# Patient Record
(demographics unavailable — no encounter records)

---

## 2019-12-10 NOTE — NUR
PT BIB RA FROM HOME WITH A C/O MID TO LOWER BURNING, INTERMITTENT ABD PAIN. PT 
IS COMPARING THE PAIN WITH CONTRACTIONS. PT STATED THAT IT IS INTERMITTENT. PT 
HAD DIARRHEA TODAY AND DENIES DIARRHEA TODAY. PT WAS PLACED ON THE MONITOR AND 
CONTINUOUS PULSE OX.

## 2019-12-11 NOTE — NUR
-------------------------------------------------------------------------------

          *** Note undone in EDM - 12/11/19 at 0241 by [email or web address] ***          

-------------------------------------------------------------------------------

IV removed. Catheter intact and site benign. Pressure and 4x4 applied to site. 
No bleeding noted. Patient discharged to home in stable condition. Written and 
verbal after care instructions given. Patient and her daughter verbalize 
understanding of instruction and Rx. Pt left by WC to the car. Pt's daughter 
had an oxygen tank in the car. 20' NC tubing was given to the Pt's daughter 
upon discharge. VSS. NAD noted.

## 2019-12-11 NOTE — NUR
IV removed. Catheter intact and site benign. Pressure and 4x4 applied to site. 
No bleeding noted. Patient discharged to home in stable condition. Written and 
verbal after care instructions given. Patient verbalizes understanding of 
instruction and Rx. Pt ambulated out with a steady gait. VSS. Resp are even and 
unlabored. Pt's  is driving pt home.

## 2019-12-11 NOTE — NUR
PT RETURNED FROM THE BATHROOM. URINE SAMPLE OBTAINED. PT STATED THAT SHE HAD 
ABD PAIN WITH URINATION.

## 2021-05-21 NOTE — NUR
PT bibself c/o dizziness and n/v x1 week. Pt aaox4 breathing evenly and 
unlabored. Pt skin warm, dry, and intact. PT attached to monitor and pox. pt 
given blanket and call light within reach. Lab at bedside